# Patient Record
Sex: MALE | Race: OTHER
[De-identification: names, ages, dates, MRNs, and addresses within clinical notes are randomized per-mention and may not be internally consistent; named-entity substitution may affect disease eponyms.]

---

## 2017-06-05 ENCOUNTER — HOSPITAL ENCOUNTER (OUTPATIENT)
Dept: HOSPITAL 61 - SURGPAT | Age: 57
Discharge: HOME | End: 2017-06-05
Attending: ORTHOPAEDIC SURGERY
Payer: COMMERCIAL

## 2017-06-05 DIAGNOSIS — Z01.818: Primary | ICD-10-CM

## 2017-06-05 LAB
ALBUMIN SERPL-MCNC: 3.9 G/DL (ref 3.4–5)
ANION GAP SERPL CALC-SCNC: 6 MMOL/L (ref 6–14)
APTT BLD: 31 SEC (ref 24–38)
BACTERIA #/AREA URNS HPF: 0 /HPF
BASOPHILS # BLD AUTO: 0 X10^3/UL (ref 0–0.2)
BASOPHILS NFR BLD: 1 % (ref 0–3)
BILIRUB UR QL STRIP: NEGATIVE
BUN SERPL-MCNC: 13 MG/DL (ref 8–26)
CALCIUM SERPL-MCNC: 9 MG/DL (ref 8.5–10.1)
CHLORIDE SERPL-SCNC: 106 MMOL/L (ref 98–107)
CO2 SERPL-SCNC: 31 MMOL/L (ref 21–32)
CREAT SERPL-MCNC: 0.9 MG/DL (ref 0.7–1.3)
EOSINOPHIL NFR BLD: 4 % (ref 0–3)
ERYTHROCYTE [DISTWIDTH] IN BLOOD BY AUTOMATED COUNT: 13 % (ref 11.5–14.5)
GFR SERPLBLD BASED ON 1.73 SQ M-ARVRAT: 86.4 ML/MIN
GLUCOSE SERPL-MCNC: 94 MG/DL (ref 70–99)
GLUCOSE UR STRIP-MCNC: NEGATIVE MG/DL
HCT VFR BLD CALC: 42.7 % (ref 39–53)
HGB BLD-MCNC: 14.9 G/DL (ref 13–17.5)
INR PPP: 1.1 (ref 0.8–1.1)
LYMPHOCYTES # BLD: 2.1 X10^3/UL (ref 1–4.8)
LYMPHOCYTES NFR BLD AUTO: 30 % (ref 24–48)
MCH RBC QN AUTO: 30 PG (ref 25–35)
MCHC RBC AUTO-ENTMCNC: 35 G/DL (ref 31–37)
MCV RBC AUTO: 87 FL (ref 79–100)
MONOCYTES NFR BLD: 7 % (ref 0–9)
NEUTROPHILS NFR BLD AUTO: 58 % (ref 31–73)
NITRITE UR QL STRIP: NEGATIVE
PH UR STRIP: 6 [PH]
PLATELET # BLD AUTO: 276 X10^3/UL (ref 140–400)
POTASSIUM SERPL-SCNC: 3.9 MMOL/L (ref 3.5–5.1)
PROT UR STRIP-MCNC: NEGATIVE MG/DL
PROTHROMBIN TIME: 13.1 SEC (ref 11.7–14)
RBC # BLD AUTO: 4.91 X10^6/UL (ref 4.3–5.7)
RBC #/AREA URNS HPF: (no result) /HPF (ref 0–2)
SODIUM SERPL-SCNC: 143 MMOL/L (ref 136–145)
SP GR UR STRIP: 1.01
SQUAMOUS #/AREA URNS LPF: (no result) /LPF
UROBILINOGEN UR-MCNC: 0.2 MG/DL
WBC # BLD AUTO: 6.9 X10^3/UL (ref 4–11)
WBC #/AREA URNS HPF: (no result) /HPF (ref 0–4)

## 2017-06-05 PROCEDURE — 85651 RBC SED RATE NONAUTOMATED: CPT

## 2017-06-05 PROCEDURE — 80048 BASIC METABOLIC PNL TOTAL CA: CPT

## 2017-06-05 PROCEDURE — 85730 THROMBOPLASTIN TIME PARTIAL: CPT

## 2017-06-05 PROCEDURE — 87641 MR-STAPH DNA AMP PROBE: CPT

## 2017-06-05 PROCEDURE — 82040 ASSAY OF SERUM ALBUMIN: CPT

## 2017-06-05 PROCEDURE — 85027 COMPLETE CBC AUTOMATED: CPT

## 2017-06-05 PROCEDURE — 81001 URINALYSIS AUTO W/SCOPE: CPT

## 2017-06-05 PROCEDURE — 36415 COLL VENOUS BLD VENIPUNCTURE: CPT

## 2017-06-05 PROCEDURE — 85610 PROTHROMBIN TIME: CPT

## 2017-06-05 PROCEDURE — 71020: CPT

## 2017-06-05 PROCEDURE — 93005 ELECTROCARDIOGRAM TRACING: CPT

## 2017-06-05 NOTE — EKG
[facility]

               [address] [place] [address]

Test Date:    2017               Test Time:    12:49:32

Pat Name:     RED COPPOLA       Department:   

Patient ID:   [id number]           Room:          

Gender:       M                        Technician:   

:          1960               Requested By: LLOYD JADE

Order Number: [id number]            Reading MD:     

                                 Measurements

Intervals                              Axis          

Rate:         70                       P:            90

GA:           166                      QRS:          -43

QRSD:         98                       T:            24

QT:           354                                    

QTc:          385                                    

                           Interpretive Statements

SINUS RHYTHM

ABNORMAL LEFT AXIS DEVIATION

CONSIDER LEFT VENTRICULAR HYPERTROPHY

QRS(T) CONTOUR ABNORMALITY

CONSIDER ANTEROLATERAL MYOCARDIAL DAMAGE

ABNORMAL ECG

RI6.01

No previous ECG available for comparison

## 2017-06-05 NOTE — RAD
Chest, 2 views, 6/5/2017:



History: Preop for knee surgery



The heart size and pulmonary vascularity are normal. There is mild tortuosity

of the thoracic aorta. No pulmonary infiltrates are seen. There is no evidence

of pleural fluid. Minimal spurring is present in the spine.



IMPRESSION: No acute cardiopulmonary abnormality is detected.

## 2018-04-30 ENCOUNTER — HOSPITAL ENCOUNTER (OUTPATIENT)
Dept: HOSPITAL 61 - SURGPAT | Age: 58
Discharge: HOME | End: 2018-04-30
Attending: ORTHOPAEDIC SURGERY
Payer: COMMERCIAL

## 2018-04-30 DIAGNOSIS — M17.12: ICD-10-CM

## 2018-04-30 DIAGNOSIS — Z01.818: Primary | ICD-10-CM

## 2018-04-30 LAB
ADD MAN DIFF?: NO
ALBUMIN SERPL-MCNC: 3.7 G/DL (ref 3.4–5)
ANION GAP SERPL CALC-SCNC: 7 MMOL/L (ref 6–14)
BACTERIA,URINE: 0 /HPF
BASO #: 0 X10^3/UL (ref 0–0.2)
BASO %: 1 % (ref 0–3)
BILIRUBIN,URINE: NEGATIVE
BLOOD UREA NITROGEN: 11 MG/DL (ref 8–26)
CALCIUM: 8.7 MG/DL (ref 8.5–10.1)
CHLORIDE: 105 MMOL/L (ref 98–107)
CLARITY,URINE: CLEAR
CO2 SERPL-SCNC: 31 MMOL/L (ref 21–32)
COLOR,URINE: YELLOW
CREAT SERPL-MCNC: 1 MG/DL (ref 0.7–1.3)
EOS #: 0.2 X10^3/UL (ref 0–0.7)
EOS %: 4 % (ref 0–3)
GFR SERPLBLD BASED ON 1.73 SQ M-ARVRAT: 76.2 ML/MIN
GLUCOSE SERPL-MCNC: 97 MG/DL (ref 70–99)
GLUCOSE,URINE: NEGATIVE MG/DL
HCG SERPL-ACNC: 5.3 X10^3/UL (ref 4–11)
HEMATOCRIT: 42.9 % (ref 39–53)
HEMOGLOBIN: 14.9 G/DL (ref 13–17.5)
INR: 1 (ref 0.8–1.1)
LYMPH #: 1.6 X10^3/UL (ref 1–4.8)
LYMPH %: 30 % (ref 24–48)
MEAN CORPUSCULAR HEMOGLOBIN: 30 PG (ref 25–35)
MEAN CORPUSCULAR HGB CONC: 35 G/DL (ref 31–37)
MEAN CORPUSCULAR VOLUME: 87 FL (ref 79–100)
MONO #: 0.3 X10^3/UL (ref 0–1.1)
MONO %: 7 % (ref 0–9)
NEUT #: 3.1 X10^3UL (ref 1.8–7.7)
NEUT %: 58 % (ref 31–73)
NITRITE,URINE: NEGATIVE
PARTIAL THROMBOPLASTIN TIME: 29 SEC (ref 24–38)
PH,URINE: 6.5
PLATELET COUNT: 310 X10^3/UL (ref 140–400)
POTASSIUM SERPL-SCNC: 3.9 MMOL/L (ref 3.5–5.1)
PROTEIN,URINE: NEGATIVE MG/DL
PROTHROMBIN TIME PATIENT: 12.4 SEC (ref 11.7–14)
RBC,URINE: (no result) /HPF (ref 0–2)
RED BLOOD COUNT: 4.95 X10^6/UL (ref 4.3–5.7)
RED CELL DISTRIBUTION WIDTH: 13.8 % (ref 11.5–14.5)
SEDIMENTATION RATE: 18 (ref 0–15)
SODIUM: 143 MMOL/L (ref 136–145)
SPECIFIC GRAVITY,URINE: 1.02
UROBILINOGEN,URINE: 0.2 MG/DL
WBC,URINE: (no result) /HPF (ref 0–4)

## 2018-04-30 PROCEDURE — 80048 BASIC METABOLIC PNL TOTAL CA: CPT

## 2018-04-30 PROCEDURE — 85610 PROTHROMBIN TIME: CPT

## 2018-04-30 PROCEDURE — 85651 RBC SED RATE NONAUTOMATED: CPT

## 2018-04-30 PROCEDURE — 81001 URINALYSIS AUTO W/SCOPE: CPT

## 2018-04-30 PROCEDURE — 87641 MR-STAPH DNA AMP PROBE: CPT

## 2018-04-30 PROCEDURE — 36415 COLL VENOUS BLD VENIPUNCTURE: CPT

## 2018-04-30 PROCEDURE — 85025 COMPLETE CBC W/AUTO DIFF WBC: CPT

## 2018-04-30 PROCEDURE — 82306 VITAMIN D 25 HYDROXY: CPT

## 2018-04-30 PROCEDURE — 85730 THROMBOPLASTIN TIME PARTIAL: CPT

## 2018-04-30 PROCEDURE — 82040 ASSAY OF SERUM ALBUMIN: CPT

## 2018-05-15 ENCOUNTER — HOSPITAL ENCOUNTER (INPATIENT)
Dept: HOSPITAL 61 - OPSVCIP | Age: 58
LOS: 2 days | Discharge: HOME HEALTH | DRG: 470 | End: 2018-05-17
Attending: ORTHOPAEDIC SURGERY | Admitting: ORTHOPAEDIC SURGERY
Payer: COMMERCIAL

## 2018-05-15 DIAGNOSIS — Z96.651: ICD-10-CM

## 2018-05-15 DIAGNOSIS — M17.12: Primary | ICD-10-CM

## 2018-05-15 PROCEDURE — 97165 OT EVAL LOW COMPLEX 30 MIN: CPT

## 2018-05-15 PROCEDURE — 86850 RBC ANTIBODY SCREEN: CPT

## 2018-05-15 PROCEDURE — 88311 DECALCIFY TISSUE: CPT

## 2018-05-15 PROCEDURE — 86900 BLOOD TYPING SEROLOGIC ABO: CPT

## 2018-05-15 PROCEDURE — 97530 THERAPEUTIC ACTIVITIES: CPT

## 2018-05-15 PROCEDURE — 88305 TISSUE EXAM BY PATHOLOGIST: CPT

## 2018-05-15 PROCEDURE — 0SRD069 REPLACEMENT OF LEFT KNEE JOINT WITH OXIDIZED ZIRCONIUM ON POLYETHYLENE SYNTHETIC SUBSTITUTE, CEMENTED, OPEN APPROACH: ICD-10-PCS

## 2018-05-15 PROCEDURE — 97162 PT EVAL MOD COMPLEX 30 MIN: CPT

## 2018-05-15 PROCEDURE — 97110 THERAPEUTIC EXERCISES: CPT

## 2018-05-15 PROCEDURE — 97150 GROUP THERAPEUTIC PROCEDURES: CPT

## 2018-05-15 PROCEDURE — 85018 HEMOGLOBIN: CPT

## 2018-05-15 PROCEDURE — 73560 X-RAY EXAM OF KNEE 1 OR 2: CPT

## 2018-05-15 PROCEDURE — 97116 GAIT TRAINING THERAPY: CPT

## 2018-05-15 PROCEDURE — 85014 HEMATOCRIT: CPT

## 2018-05-15 PROCEDURE — 36415 COLL VENOUS BLD VENIPUNCTURE: CPT

## 2018-05-15 PROCEDURE — 86901 BLOOD TYPING SEROLOGIC RH(D): CPT

## 2018-05-15 RX ADMIN — Medication 1 MG: at 17:33

## 2018-05-15 RX ADMIN — FENTANYL CITRATE 1 MCG: 50 INJECTION INTRAMUSCULAR; INTRAVENOUS at 12:53

## 2018-05-15 RX ADMIN — MELOXICAM 1 MG: 7.5 TABLET ORAL at 08:27

## 2018-05-15 RX ADMIN — SODIUM CHLORIDE, SODIUM LACTATE, POTASSIUM CHLORIDE, AND CALCIUM CHLORIDE 1 MLS/HR: .6; .31; .03; .02 INJECTION, SOLUTION INTRAVENOUS at 12:05

## 2018-05-15 RX ADMIN — MORPHINE SULFATE 1: 10 INJECTION INTRAMUSCULAR; INTRAVENOUS; SUBCUTANEOUS at 10:13

## 2018-05-15 RX ADMIN — TOBRAMYCIN 1 GM: 1.2 INJECTION, POWDER, LYOPHILIZED, FOR SOLUTION INTRAVENOUS at 10:13

## 2018-05-15 RX ADMIN — TRANEXAMIC ACID 1 MLS/HR: 100 INJECTION, SOLUTION INTRAVENOUS at 10:01

## 2018-05-15 RX ADMIN — KETOROLAC TROMETHAMINE 1 MLS/HR: 30 INJECTION, SOLUTION INTRAMUSCULAR at 18:31

## 2018-05-15 RX ADMIN — MULTIPLE VITAMINS W/ MINERALS TAB 1 TAB: TAB at 17:33

## 2018-05-15 RX ADMIN — FENTANYL CITRATE 1 MCG: 50 INJECTION INTRAMUSCULAR; INTRAVENOUS at 12:06

## 2018-05-15 RX ADMIN — VANCOMYCIN HYDROCHLORIDE 1 GM: 1 INJECTION, POWDER, FOR SOLUTION INTRAVENOUS at 10:13

## 2018-05-15 RX ADMIN — DEXTROSE AND SODIUM CHLORIDE 1 MLS/HR: 5; .45 INJECTION, SOLUTION INTRAVENOUS at 21:13

## 2018-05-15 RX ADMIN — SODIUM CHLORIDE, SODIUM LACTATE, POTASSIUM CHLORIDE, AND CALCIUM CHLORIDE 1 MLS/HR: .6; .31; .03; .02 INJECTION, SOLUTION INTRAVENOUS at 07:00

## 2018-05-15 RX ADMIN — DEXTROSE AND SODIUM CHLORIDE 1 MLS/HR: 5; .45 INJECTION, SOLUTION INTRAVENOUS at 21:17

## 2018-05-15 RX ADMIN — ASPIRIN 1 MG: 325 TABLET, DELAYED RELEASE ORAL at 21:10

## 2018-05-15 RX ADMIN — MELOXICAM 1 MG: 7.5 TABLET ORAL at 14:30

## 2018-05-15 RX ADMIN — TRANEXAMIC ACID 1 MLS/HR: 100 INJECTION, SOLUTION INTRAVENOUS at 11:02

## 2018-05-16 LAB
HEMATOCRIT: 35.1 % (ref 39–53)
HEMOGLOBIN: 12.2 G/DL (ref 13–17.5)
MEAN CORPUSCULAR HGB CONC: 35 G/DL (ref 31–37)

## 2018-05-16 RX ADMIN — Medication 1 MG: at 08:26

## 2018-05-16 RX ADMIN — KETOROLAC TROMETHAMINE 1 MLS/HR: 30 INJECTION, SOLUTION INTRAMUSCULAR at 05:48

## 2018-05-16 RX ADMIN — MULTIPLE VITAMINS W/ MINERALS TAB 1 TAB: TAB at 08:26

## 2018-05-16 RX ADMIN — ASPIRIN 1 MG: 325 TABLET, DELAYED RELEASE ORAL at 21:13

## 2018-05-16 RX ADMIN — Medication 1 MG: at 16:59

## 2018-05-16 RX ADMIN — ASPIRIN 1 MG: 325 TABLET, DELAYED RELEASE ORAL at 08:26

## 2018-05-16 RX ADMIN — MELOXICAM 1 MG: 7.5 TABLET ORAL at 08:27

## 2018-05-16 RX ADMIN — SENNOSIDES AND DOCUSATE SODIUM 1 TAB: 8.6; 5 TABLET ORAL at 08:26

## 2018-05-17 LAB
HEMATOCRIT: 35.2 % (ref 39–53)
HEMOGLOBIN: 12.2 G/DL (ref 13–17.5)
MEAN CORPUSCULAR HGB CONC: 35 G/DL (ref 31–37)

## 2018-05-17 RX ADMIN — ASPIRIN 1 MG: 325 TABLET, DELAYED RELEASE ORAL at 08:14

## 2018-05-17 RX ADMIN — SENNOSIDES AND DOCUSATE SODIUM 1 TAB: 8.6; 5 TABLET ORAL at 08:15

## 2018-05-17 RX ADMIN — ERGOCALCIFEROL 1 UNIT: 1.25 CAPSULE ORAL at 08:51

## 2018-05-17 RX ADMIN — MELOXICAM 1 MG: 7.5 TABLET ORAL at 08:17

## 2018-05-17 RX ADMIN — MULTIPLE VITAMINS W/ MINERALS TAB 1 TAB: TAB at 08:15

## 2018-05-17 RX ADMIN — Medication 1 MG: at 08:15
